# Patient Record
Sex: MALE | Race: WHITE | NOT HISPANIC OR LATINO | URBAN - METROPOLITAN AREA
[De-identification: names, ages, dates, MRNs, and addresses within clinical notes are randomized per-mention and may not be internally consistent; named-entity substitution may affect disease eponyms.]

---

## 2020-07-31 ENCOUNTER — APPOINTMENT (RX ONLY)
Dept: URBAN - METROPOLITAN AREA CLINIC 322 | Facility: CLINIC | Age: 60
Setting detail: DERMATOLOGY
End: 2020-07-31

## 2020-07-31 VITALS — TEMPERATURE: 99.6 F

## 2020-07-31 DIAGNOSIS — L12.0 BULLOUS PEMPHIGOID: ICD-10-CM

## 2020-07-31 PROBLEM — L30.9 DERMATITIS, UNSPECIFIED: Status: ACTIVE | Noted: 2020-07-31

## 2020-07-31 PROCEDURE — 11104 PUNCH BX SKIN SINGLE LESION: CPT

## 2020-07-31 PROCEDURE — 11105 PUNCH BX SKIN EA SEP/ADDL: CPT

## 2020-07-31 PROCEDURE — ? FULL BODY SKIN EXAM - DECLINED

## 2020-07-31 PROCEDURE — ? COUNSELING

## 2020-07-31 PROCEDURE — ? BIOPSY BY PUNCH METHOD

## 2020-07-31 PROCEDURE — ? BIOPSY BY PUNCH METHOD FOR DIF

## 2020-07-31 PROCEDURE — ? PRESCRIPTION

## 2020-07-31 RX ORDER — DOXYCYCLINE HYCLATE 100 MG/1
CAPSULE, GELATIN COATED ORAL
Qty: 20 | Refills: 0 | Status: ERX | COMMUNITY
Start: 2020-07-31

## 2020-07-31 RX ORDER — PREDNISONE 10 MG/1
TABLET ORAL QAM
Qty: 30 | Refills: 0 | Status: ERX | COMMUNITY
Start: 2020-07-31

## 2020-07-31 RX ORDER — HYDROXYZINE HYDROCHLORIDE 25 MG/1
TABLET, FILM COATED ORAL
Qty: 30 | Refills: 0 | Status: ERX | COMMUNITY
Start: 2020-07-31

## 2020-07-31 RX ADMIN — PREDNISONE: 10 TABLET ORAL at 00:00

## 2020-07-31 RX ADMIN — DOXYCYCLINE HYCLATE: 100 CAPSULE, GELATIN COATED ORAL at 00:00

## 2020-07-31 RX ADMIN — HYDROXYZINE HYDROCHLORIDE: 25 TABLET, FILM COATED ORAL at 00:00

## 2020-07-31 ASSESSMENT — LOCATION SIMPLE DESCRIPTION DERM: LOCATION SIMPLE: LEFT FOREARM

## 2020-07-31 ASSESSMENT — LOCATION ZONE DERM: LOCATION ZONE: ARM

## 2020-07-31 ASSESSMENT — LOCATION DETAILED DESCRIPTION DERM: LOCATION DETAILED: LEFT PROXIMAL DORSAL FOREARM

## 2020-07-31 ASSESSMENT — BSA RASH: BSA RASH: 15

## 2020-07-31 NOTE — PROCEDURE: COUNSELING
Detail Level: Zone
Patient Specific Counseling (Will Not Stick From Patient To Patient): Discussed possible etiology and treatment options.  Will send for H&E and DIF.  Pt to treat with prednisone 30mgx 5 days, 20mgx 5 days, 10mgx 5 days.  Doxy 100mg bid for 14 days and hydroxyzine 25mg qhs

## 2020-07-31 NOTE — HPI: RASH
What Type Of Note Output Would You Prefer (Optional)?: Standard Output
Is The Patient Presenting As Previously Scheduled?: Yes
How Severe Is Your Rash?: moderate
Is This A New Presentation, Or A Follow-Up?: Rash
Additional History: Pt declines fbse today

## 2020-08-06 ENCOUNTER — RX ONLY (OUTPATIENT)
Age: 60
Setting detail: RX ONLY
End: 2020-08-06

## 2020-08-06 RX ORDER — TRIAMCINOLONE ACETONIDE 1 MG/G
OINTMENT TOPICAL
Qty: 1 | Refills: 0 | Status: ERX | COMMUNITY
Start: 2020-08-06

## 2020-08-06 RX ORDER — SULFAMETHOXAZOLE AND TRIMETHOPRIM 800; 160 MG/1; MG/1
TABLET ORAL
Qty: 20 | Refills: 0 | Status: ERX | COMMUNITY
Start: 2020-08-06

## 2020-08-18 ENCOUNTER — APPOINTMENT (RX ONLY)
Dept: URBAN - METROPOLITAN AREA CLINIC 322 | Facility: CLINIC | Age: 60
Setting detail: DERMATOLOGY
End: 2020-08-18

## 2020-08-18 VITALS — TEMPERATURE: 98.7 F

## 2020-08-18 DIAGNOSIS — L01.03 BULLOUS IMPETIGO: ICD-10-CM

## 2020-08-18 PROCEDURE — ? FULL BODY SKIN EXAM - DECLINED

## 2020-08-18 PROCEDURE — ? COUNSELING

## 2020-08-18 PROCEDURE — 99213 OFFICE O/P EST LOW 20 MIN: CPT

## 2020-08-18 PROCEDURE — ? PRESCRIPTION

## 2020-08-18 RX ORDER — SULFAMETHOXAZOLE AND TRIMETHOPRIM 800; 160 MG/1; MG/1
1 TABLET ORAL
Qty: 20 | Refills: 0 | Status: ERX | COMMUNITY
Start: 2020-08-18

## 2020-08-18 RX ORDER — TRIAMCINOLONE ACETONIDE 1 MG/G
CREAM TOPICAL
Qty: 1 | Refills: 1 | Status: ERX | COMMUNITY
Start: 2020-08-18

## 2020-08-18 RX ADMIN — SULFAMETHOXAZOLE AND TRIMETHOPRIM 1: 800; 160 TABLET ORAL at 00:00

## 2020-08-18 RX ADMIN — TRIAMCINOLONE ACETONIDE: 1 CREAM TOPICAL at 00:00

## 2020-08-18 ASSESSMENT — LOCATION SIMPLE DESCRIPTION DERM: LOCATION SIMPLE: RIGHT UPPER BACK

## 2020-08-18 ASSESSMENT — LOCATION DETAILED DESCRIPTION DERM: LOCATION DETAILED: RIGHT INFERIOR UPPER BACK

## 2020-08-18 ASSESSMENT — LOCATION ZONE DERM: LOCATION ZONE: TRUNK

## 2020-08-18 NOTE — PROCEDURE: MIPS QUALITY
Quality 431: Preventive Care And Screening: Unhealthy Alcohol Use - Screening: Patient screened for unhealthy alcohol use using a single question and scores less than 2 times per year
Detail Level: Generalized
Quality 130: Documentation Of Current Medications In The Medical Record: Current Medications Documented
Quality 226: Preventive Care And Screening: Tobacco Use: Screening And Cessation Intervention: Patient screened for tobacco use, is a smoker AND received Cessation Counseling

## 2020-08-18 NOTE — PROCEDURE: COUNSELING
Patient Specific Counseling (Will Not Stick From Patient To Patient): Pt advised currently resolving; pt is very worried about repeat flare.  Will repeat another 10 day course of bactrim and continue to spot treat any new lesions with tac cream bid.  pt advised on good hydration with cetaphil/cerave.  Pt to continue to use hibicleanse when showering
Detail Level: Generalized

## 2020-09-16 RX ORDER — TRIAMCINOLONE ACETONIDE 1 MG/G
1 CREAM TOPICAL BID
Qty: 1 | Refills: 0 | Status: ERX

## 2020-10-22 ENCOUNTER — APPOINTMENT (RX ONLY)
Dept: URBAN - METROPOLITAN AREA CLINIC 322 | Facility: CLINIC | Age: 60
Setting detail: DERMATOLOGY
End: 2020-10-22

## 2020-10-22 VITALS — TEMPERATURE: 97.8 F

## 2020-10-22 DIAGNOSIS — T88.7XX: ICD-10-CM

## 2020-10-22 DIAGNOSIS — L01.03 BULLOUS IMPETIGO: ICD-10-CM

## 2020-10-22 PROBLEM — T88.7XXA UNSPECIFIED ADVERSE EFFECT OF DRUG OR MEDICAMENT, INITIAL ENCOUNTER: Status: ACTIVE | Noted: 2020-10-22

## 2020-10-22 PROCEDURE — ? PRESCRIPTION

## 2020-10-22 PROCEDURE — ? COUNSELING

## 2020-10-22 PROCEDURE — 99213 OFFICE O/P EST LOW 20 MIN: CPT

## 2020-10-22 RX ORDER — DOXYCYCLINE HYCLATE 100 MG/1
1 TABLET, COATED ORAL
Qty: 30 | Refills: 0 | Status: ERX | COMMUNITY
Start: 2020-10-22

## 2020-10-22 RX ORDER — PREDNISONE 20 MG/1
1 TABLET ORAL
Qty: 10 | Refills: 0 | Status: ERX | COMMUNITY
Start: 2020-10-22

## 2020-10-22 RX ORDER — TRIAMCINOLONE ACETONIDE 1 MG/G
CREAM TOPICAL BID
Qty: 1 | Refills: 1 | Status: ERX

## 2020-10-22 RX ADMIN — PREDNISONE 1: 20 TABLET ORAL at 00:00

## 2020-10-22 RX ADMIN — DOXYCYCLINE HYCLATE 1: 100 TABLET, COATED ORAL at 00:00

## 2020-10-22 ASSESSMENT — LOCATION SIMPLE DESCRIPTION DERM: LOCATION SIMPLE: CHEST

## 2020-10-22 ASSESSMENT — LOCATION ZONE DERM: LOCATION ZONE: TRUNK

## 2020-10-22 ASSESSMENT — LOCATION DETAILED DESCRIPTION DERM: LOCATION DETAILED: STERNUM

## 2020-10-22 NOTE — PROCEDURE: COUNSELING
Detail Level: Detailed
Detail Level: Generalized
Patient Specific Counseling (Will Not Stick From Patient To Patient): Emphasized pt is having hypersensitivity response from resolving bulluous impetigo.  Continue to treat spots with tac cream bid and will start on prednisone 20mg qday for 10 days and doxy 100mg qday for a month